# Patient Record
Sex: MALE | Race: WHITE | RURAL
[De-identification: names, ages, dates, MRNs, and addresses within clinical notes are randomized per-mention and may not be internally consistent; named-entity substitution may affect disease eponyms.]

---

## 2019-12-05 ENCOUNTER — OFFICE VISIT (OUTPATIENT)
Dept: FAMILY MEDICINE CLINIC | Age: 44
End: 2019-12-05

## 2019-12-05 VITALS
RESPIRATION RATE: 16 BRPM | WEIGHT: 210 LBS | SYSTOLIC BLOOD PRESSURE: 124 MMHG | DIASTOLIC BLOOD PRESSURE: 81 MMHG | OXYGEN SATURATION: 96 % | BODY MASS INDEX: 29.4 KG/M2 | TEMPERATURE: 98.7 F | HEIGHT: 71 IN | HEART RATE: 65 BPM

## 2019-12-05 DIAGNOSIS — Z23 ENCOUNTER FOR IMMUNIZATION: ICD-10-CM

## 2019-12-05 DIAGNOSIS — R30.0 DYSURIA: ICD-10-CM

## 2019-12-05 DIAGNOSIS — R39.15 URGENCY OF URINATION: ICD-10-CM

## 2019-12-05 DIAGNOSIS — N45.1 EPIDIDYMITIS: Primary | ICD-10-CM

## 2019-12-05 PROBLEM — G56.03 CARPAL TUNNEL SYNDROME ON BOTH SIDES: Status: ACTIVE | Noted: 2019-02-19

## 2019-12-05 PROBLEM — M54.2 CERVICALGIA: Status: ACTIVE | Noted: 2019-02-19

## 2019-12-05 PROBLEM — M47.812 SPONDYLOSIS OF CERVICAL REGION WITHOUT MYELOPATHY OR RADICULOPATHY: Status: ACTIVE | Noted: 2019-02-19

## 2019-12-05 LAB
BILIRUB UR QL STRIP: NEGATIVE
GLUCOSE UR-MCNC: NEGATIVE MG/DL
KETONES P FAST UR STRIP-MCNC: NORMAL MG/DL
PH UR STRIP: 7 [PH] (ref 4.6–8)
PROT UR QL STRIP: NEGATIVE
SP GR UR STRIP: 1.02 (ref 1–1.03)
UA UROBILINOGEN AMB POC: NORMAL (ref 0.2–1)
URINALYSIS CLARITY POC: CLEAR
URINALYSIS COLOR POC: YELLOW
URINE BLOOD POC: NEGATIVE
URINE LEUKOCYTES POC: NEGATIVE
URINE NITRITES POC: NEGATIVE

## 2019-12-05 RX ORDER — SILDENAFIL 100 MG/1
100 TABLET, FILM COATED ORAL
COMMUNITY
End: 2020-04-09 | Stop reason: SDUPTHER

## 2019-12-05 RX ORDER — CYCLOBENZAPRINE HCL 10 MG
TABLET ORAL
COMMUNITY
End: 2020-04-09

## 2019-12-05 RX ORDER — AMOXICILLIN AND CLAVULANATE POTASSIUM 875; 125 MG/1; MG/1
1 TABLET, FILM COATED ORAL EVERY 12 HOURS
Qty: 20 TAB | Refills: 0 | Status: SHIPPED | OUTPATIENT
Start: 2019-12-05 | End: 2019-12-15

## 2019-12-05 RX ORDER — NAPROXEN SODIUM 220 MG
TABLET ORAL
COMMUNITY

## 2019-12-05 NOTE — PROGRESS NOTES
I discussed the findings, assessment and plan in detail with the resident and agree with the resident's findings and plan as documented in the resident's note. Epididymitis is working dx in an area where we have levaquin resistance. Emilee Carranza M.D.

## 2019-12-05 NOTE — PROGRESS NOTES
1. Have you been to the ER, urgent care clinic since your last visit? Hospitalized since your last visit? Yes Urgent Care    2. Have you seen or consulted any other health care providers outside of the [address] [place] [address] since your last visit? Include any pap smears or colon screening. No  Reviewed record in preparation for visit and have necessary documentation  Pt did not bring medication to office visit for review    Goals that were addressed and/or need to be completed during or after this appointment include   Health Maintenance Due   Topic Date Due    DTaP/Tdap/Td series (1 - Tdap) 10/02/1987    Influenza Age 5 to Adult  08/01/2019     Braden Roldan is a 38 y.o. male who presents for routine immunizations. He denies any symptoms , reactions or allergies that would exclude them from being immunized today. Risks and adverse reactions were discussed and the VIS was given to them. All questions were addressed. . There were no reactions observed.     Zac Orourke LPN

## 2019-12-05 NOTE — PROGRESS NOTES
Progress Note    Patient: Nidhi Garcia MRN: [id number]  SSN: [id number]    YOB: 1975  Age: 38 y.o. Sex: male        Chief Complaint   Patient presents with   [facility] Establish Care    Immunization/Injection         Subjective:     Problems addressed:  Encounter Diagnoses     ICD-10-CM ICD-9-CM   1. Epididymitis N45.1 604.90   2. Dysuria R30.0 788.1   3. Urgency of urination R39.15 788.63   4. Encounter for immunization Z22 V03.89     37 y/o M presents to establish care and for dysuria. Reports PMH of carpal tunnel syndrome, DDD, and medically retired  in June 2019 for TBI, PTSD, hand numbness (radiculopathy). Will request prior medical records from [facility]. Pt reports dysuria and urgency for 3 weeks for which he went to urgent care over the weekend, says UA was normal and was told he needs and ultrasound but had to go to PCP first. Feels like \"kicked in my right nut for 3 weeks\". Fatigued. Denies any history of UTI or genital problems in the past.      Current and past medical information:    Current Medications after this visit[de-identified]     Current Outpatient Medications   Medication Sig    sildenafil citrate (VIAGRA) 100 mg tablet Take 100 mg by mouth.  naproxen sodium (NAPROSYN) 220 mg tablet Take  by mouth.  cyclobenzaprine (FLEXERIL) 10 mg tablet Take  by mouth.  amoxicillin-clavulanate (AUGMENTIN) 875-125 mg per tablet Take 1 Tab by mouth every twelve (12) hours for 10 days. No current facility-administered medications for this visit. Patient Active Problem List    Diagnosis Date Noted    Carpal tunnel syndrome on both sides 02/19/2019    Cervicalgia 02/19/2019    Spondylosis of cervical region without myelopathy or radiculopathy 02/19/2019       Past Medical History:   Diagnosis Date    DDD (degenerative disc disease), cervical        No Known Allergies    History reviewed. No pertinent surgical history.     Social History     Tobacco Use    Smoking status: Never Smoker    Smokeless tobacco: Never Used   Substance Use Topics    Alcohol use: Yes     Frequency: 4 or more times a week    Drug use: Never         Review of Systems   Constitutional: Negative for chills and fever. HENT: Negative for congestion. Eyes: Negative for blurred vision and double vision. Respiratory: Negative for cough and shortness of breath. Cardiovascular: Negative for chest pain and palpitations. Gastrointestinal: Negative for blood in stool, constipation, diarrhea, nausea and vomiting. Genitourinary: Positive for dysuria and urgency. Negative for hematuria. Musculoskeletal: Negative for myalgias. Skin: Negative for rash. Neurological: Negative for dizziness and headaches. Psychiatric/Behavioral: Negative for substance abuse. Objective:     Vitals:    12/05/19 0956   BP: 124/81   Pulse: 65   Resp: 16   Temp: 98.7 °F (37.1 °C)   TempSrc: Oral   SpO2: 96%   Weight: 210 lb (95.3 kg)   Height: 5' 11\" (1.803 m)      Body mass index is 29.29 kg/m². Physical Exam  Constitutional:       General: He is not in acute distress. Appearance: Normal appearance. He is not ill-appearing or diaphoretic. HENT:      Head: Normocephalic and atraumatic. Right Ear: External ear normal.      Left Ear: External ear normal.      Mouth/Throat:      Mouth: Mucous membranes are moist.      Pharynx: Oropharynx is clear. Eyes:      Conjunctiva/sclera: Conjunctivae normal.      Pupils: Pupils are equal, round, and reactive to light. Neck:      Musculoskeletal: Neck supple. Cardiovascular:      Rate and Rhythm: Normal rate and regular rhythm. Heart sounds: No murmur. Pulmonary:      Effort: Pulmonary effort is normal. No respiratory distress. Breath sounds: No wheezing or rhonchi. Abdominal:      General: Bowel sounds are normal. There is no distension. Palpations: Abdomen is soft. There is no mass. Tenderness: There is no tenderness.  There is no guarding or rebound. Hernia: No hernia is present. Genitourinary:     Penis: Normal.       Scrotum/Testes: Normal.      Prostate: Normal.      Rectum: Normal.      Comments: Tenderness to palpation above R testicle  Musculoskeletal:         General: No swelling, tenderness or deformity. Skin:     General: Skin is warm and dry. Neurological:      General: No focal deficit present. Mental Status: He is alert. Psychiatric:         Mood and Affect: Mood normal.         Behavior: Behavior normal.          There are no preventive care reminders to display for this patient. Assessment and orders:     Encounter Diagnoses     ICD-10-CM ICD-9-CM   1. Epididymitis N45.1 604.90   2. Dysuria R30.0 788.1   3. Urgency of urination R39.15 788.63   4. Encounter for immunization Z23 V03.89       37 y/o M with 3 weeks of dysuria and urgency likely 2/2 to epididymitis    1. Epididymitis - symptoms for 3 weeks, tenderness to palpation above R testicle on exam, 85% of prostatitis pathogens in the region resistant to levquin, POC UA unremarkable, no prostate tenderness  - REFERRAL TO UROLOGY for ultrasound  -START amoxicillin-clavulanate (AUGMENTIN) 875-125 mg per tablet; Take 1 Tab by mouth every twelve (12) hours for 10 days. Dispense: 20 Tab; Refill: 0  - CULTURE, URINE; Future    2. Dysuria - likely 2/2 to epididymitis   - Treatment as above    3. Urgency of urination - likely 2/2 to epididymitis   - Treatment as above      Plan of care:  Discussed diagnoses in detail with patient. Medication risks/benefits/side effects discussed with patient. All of the patient's questions were addressed. The patient understands and agrees with our plan of care. The patient knows to call back if they are unsure of or forget any changes we discussed today or if the symptoms change.      The patient received an After-Visit Summary which contains VS, orders, medication list and allergy list. This can be used as a \"mini-medical record\" should they have to seek medical care while out of town. Follow-up and Dispositions    · Return in about 2 weeks (around 12/19/2019), or if symptoms worsen or fail to improve, for Infection, Pain, ultrasound results.          Future Appointments   Date Time Provider Angélica Rehman   12/24/2019  8:00 AM Rolando Mike MD [facility] SCHED       Signed By: Yolanda Marte MD     December 5, 2019

## 2019-12-05 NOTE — PATIENT INSTRUCTIONS
December 5, 2019  [address]      Dear Anoop Alberts: Thank you for requesting access to [employer]. Please follow the instructions below to view your test results, access and download parts of your medical record, view details of your past and upcoming appointments, and view your medications online. How Do I Sign Up? 1. In your internet browser, go to [email or web address]  2. Click on the First Time User? Click Here link in the Sign In box. You will see the New Member Sign Up page. 3. Enter your [employer] Access Code exactly as it appears below. You will not need to use this code after youve completed the sign-up process. If you do not sign up before the expiration date, you must request a new code. · [facility] Access Code: [email or web address]  · Expires: 1/19/2020  6:58 AM    4. Enter the last four digits of your Social Security Number (xxxx) and Date of Birth (mm/dd/yyyy) as indicated and click Submit. You will be taken to the next sign-up page. 5. Create a [facility]taic ID. This will be your [employer] login ID and cannot be changed, so think of one that is secure and easy to remember. 6. Create a [facility] password. You can change your password at any time. 7. Enter your Password Reset Question and Answer. This can be used at a later time if you forget your password. 8. Enter your e-mail address. You will receive e-mail notification when new information is available in [address]. 9. Click Sign Up. You can now view and download portions of your medical record. 10. Click the Download Summary menu link to download a portable copy of your medical information. Additional Information:              If you require any assistance or have any questions, please contact our [address] at [phone], email at [email or web address] or check online in our Frequently Asked Questions.     Remember, [employer] is NOT to be used for urgent needs. For medical emergencies, dial 911. Now available from your iPhone and Android! Sincerely,   Lacey Leon       new to my practice         Epididymitis and Orchitis: Care Instructions  Your Care Instructions    Epididymitis is pain and swelling of the tube that is attached to each testicle. This tube is called the epididymis. Orchitis is pain and swelling of the testicle. Infection with bacteria often causes these conditions. Sexually transmitted infections (STIs) also can cause both conditions. This is often the case in men younger than 28. Other causes in boys and older men are infections from surgery or having a catheter that drains urine. The mumps virus also can cause orchitis. Anti-inflammatory or pain medicines can help with the pain. Antibiotics are used if the problem is caused by bacteria. They are not used if a virus is the cause. Your testicle may stay swollen for many days or even a few weeks. The doctor has checked you carefully, but problems can develop later. If you notice any problems or new symptoms, get medical treatment right away. Follow-up care is a key part of your treatment and safety. Be sure to make and go to all appointments, and call your doctor if you are having problems. It's also a good idea to know your test results and keep a list of the medicines you take. How can you care for yourself at home? · If your doctor prescribed antibiotics, take them as directed. Do not stop taking them just because you feel better. You need to take the full course of antibiotics. · Ask your doctor if you can take an over-the-counter pain medicine, such as acetaminophen (Tylenol), ibuprofen (Advil, Motrin), or naproxen (Aleve). Be safe with medicines. Read and follow all instructions on the label. · Limit your activity to what is comfortable. · Wear snug underwear or an athletic supporter. This can help reduce pain. · Apply either cold or heat to the swollen area.  Use the one that works best for your pain. Sitting in a warm bath for 15 minutes twice a day will help reduce the swelling more quickly. · If you have been told that an STI may have caused your condition, do not have sex until your doctor says it is safe. This will prevent spreading the infection. Tell your sex partner or partners that they need to be checked. They may need treatment. When should you call for help? Call your doctor now or seek immediate medical care if:    · Your pain gets worse.     · You have a new or higher fever.     · You have new or more swelling of your testicle.     · You have new belly pain, or your pain gets worse.    Watch closely for changes in your health, and be sure to contact your doctor if:    · You do not get better as expected. Where can you learn more? Go to http://maria elena-dre.info/. Enter S360 in the search box to learn more about \"Epididymitis and Orchitis: Care Instructions. \"  Current as of: December 19, 2018  Content Version: 12.2  © 7273-5553 [employer]. Care instructions adapted under license by [employer] (which disclaims liability or warranty for this information). If you have questions about a medical condition or this instruction, always ask your healthcare professional. [address] any warranty or liability for your use of this information.

## 2019-12-06 ENCOUNTER — HOSPITAL ENCOUNTER (OUTPATIENT)
Dept: LAB | Age: 44
Discharge: HOME | End: 2019-12-06

## 2019-12-06 DIAGNOSIS — R30.0 DYSURIA: ICD-10-CM

## 2019-12-06 DIAGNOSIS — N45.1 EPIDIDYMITIS: ICD-10-CM

## 2019-12-06 LAB
COMMENT, HOLDF: NORMAL
SAMPLES BEING HELD,HOLD: NORMAL

## 2019-12-08 LAB
BACTERIA SPEC CULT: NORMAL
CC UR VC: NORMAL
SERVICE CMNT-IMP: NORMAL

## 2019-12-09 NOTE — PROGRESS NOTES
Results noted. No further actions required.      Estefany Rodriguez MD  PGY2 Family Medicine Resident

## 2019-12-13 PROBLEM — F43.10 PTSD (POST-TRAUMATIC STRESS DISORDER): Status: ACTIVE | Noted: 2019-12-13

## 2019-12-24 ENCOUNTER — OFFICE VISIT (OUTPATIENT)
Dept: FAMILY MEDICINE CLINIC | Age: 44
End: 2019-12-24

## 2019-12-24 VITALS
HEART RATE: 61 BPM | SYSTOLIC BLOOD PRESSURE: 120 MMHG | TEMPERATURE: 98.1 F | HEIGHT: 71 IN | BODY MASS INDEX: 30.52 KG/M2 | DIASTOLIC BLOOD PRESSURE: 81 MMHG | OXYGEN SATURATION: 98 % | WEIGHT: 218 LBS | RESPIRATION RATE: 18 BRPM

## 2019-12-24 DIAGNOSIS — G89.29 CHRONIC PAIN OF LEFT ANKLE: ICD-10-CM

## 2019-12-24 DIAGNOSIS — N45.1 EPIDIDYMITIS: Primary | ICD-10-CM

## 2019-12-24 DIAGNOSIS — M25.572 CHRONIC PAIN OF LEFT ANKLE: ICD-10-CM

## 2019-12-24 RX ORDER — LEVOFLOXACIN 750 MG/1
750 TABLET ORAL DAILY
Qty: 10 TAB | Refills: 0 | Status: SHIPPED | OUTPATIENT
Start: 2019-12-24 | End: 2020-01-03

## 2019-12-24 NOTE — PATIENT INSTRUCTIONS
Epididymitis and Orchitis: Care Instructions  Your Care Instructions    Epididymitis is pain and swelling of the tube that is attached to each testicle. This tube is called the epididymis. Orchitis is pain and swelling of the testicle. Infection with bacteria often causes these conditions. Sexually transmitted infections (STIs) also can cause both conditions. This is often the case in men younger than 28. Other causes in boys and older men are infections from surgery or having a catheter that drains urine. The mumps virus also can cause orchitis. Anti-inflammatory or pain medicines can help with the pain. Antibiotics are used if the problem is caused by bacteria. They are not used if a virus is the cause. Your testicle may stay swollen for many days or even a few weeks. The doctor has checked you carefully, but problems can develop later. If you notice any problems or new symptoms, get medical treatment right away. Follow-up care is a key part of your treatment and safety. Be sure to make and go to all appointments, and call your doctor if you are having problems. It's also a good idea to know your test results and keep a list of the medicines you take. How can you care for yourself at home? · If your doctor prescribed antibiotics, take them as directed. Do not stop taking them just because you feel better. You need to take the full course of antibiotics. · Ask your doctor if you can take an over-the-counter pain medicine, such as acetaminophen (Tylenol), ibuprofen (Advil, Motrin), or naproxen (Aleve). Be safe with medicines. Read and follow all instructions on the label. · Limit your activity to what is comfortable. · Wear snug underwear or an athletic supporter. This can help reduce pain. · Apply either cold or heat to the swollen area. Use the one that works best for your pain. Sitting in a warm bath for 15 minutes twice a day will help reduce the swelling more quickly.   · If you have been told that an STI may have caused your condition, do not have sex until your doctor says it is safe. This will prevent spreading the infection. Tell your sex partner or partners that they need to be checked. They may need treatment. When should you call for help? Call your doctor now or seek immediate medical care if:    · Your pain gets worse.     · You have a new or higher fever.     · You have new or more swelling of your testicle.     · You have new belly pain, or your pain gets worse.    Watch closely for changes in your health, and be sure to contact your doctor if:    · You do not get better as expected. Where can you learn more? Go to http://maria elena-dre.info/. Enter S360 in the search box to learn more about \"Epididymitis and Orchitis: Care Instructions. \"  Current as of: December 19, 2018  Content Version: 12.2  © 3261-9147 [employer]. Care instructions adapted under license by [employer] (which disclaims liability or warranty for this information). If you have questions about a medical condition or this instruction, always ask your healthcare professional. [address] any warranty or liability for your use of this information.

## 2019-12-24 NOTE — PROGRESS NOTES
1. Have you been to the ER, urgent care clinic since your last visit? Hospitalized since your last visit? No    2. Have you seen or consulted any other health care providers outside of the [address] [place] [address] since your last visit? Include any pap smears or colon screening. Urologist     Reviewed record in preparation for visit and have necessary documentation  Goals that were addressed and/or need to be completed during or after this appointment include     There are no preventive care reminders to display for this patient. Patient is accompanied by self I have received verbal consent from Shaan Leon to discuss any/all medical information while they are present in the room.

## 2019-12-24 NOTE — PROGRESS NOTES
Progress Note    Patient: Baldemar Cash MRN: [id number]  SSN: [id number]    YOB: 1975  Age: 38 y.o. Sex: male        Chief Complaint   Patient presents with    Follow-up         Subjective:     Problems addressed:  Encounter Diagnoses     ICD-10-CM ICD-9-CM   1. Epididymitis N45.1 604.90   2. Chronic pain of left ankle M25.572 719.47    G89.29 338.29       HPI: 39 y/o M presents for f/u for Epididymitis. Pt took Augmentin as prescribed for 10 days and symptoms got \"about 85% better\". Pain about 1-2 inches above R testicle. After completing antibiotic, pain has now started to worsen and become more frequent and is \"just as bad as it was before\". Pt denies any risk for STIs, \"I've been with the same woman for 16 years\". Pt drinking a lot of water. Denies any hematuria, abdominal pain, swelling, N/V. Chronic L ankle pain: Pt requests referral for chronic L ankle pain. Hx of \"many\" sprained ankles, saw podiatries who told him he would need surgery soon for \"ligaments\". Pain is now bothering pt more and has apt with prior podiatrist in February, but would like referral to see someone else if he can get in sooner. Pt denies any hx of surgery to L ankle or other injuries other than \"sprained ankles\". Current and past medical information:    Current Medications after this visit[de-identified]     Current Outpatient Medications   Medication Sig    levoFLOXacin (LEVAQUIN) 750 mg tablet Take 1 Tab by mouth daily for 10 days.  sildenafil citrate (VIAGRA) 100 mg tablet Take 100 mg by mouth.  naproxen sodium (NAPROSYN) 220 mg tablet Take  by mouth.  cyclobenzaprine (FLEXERIL) 10 mg tablet Take  by mouth. No current facility-administered medications for this visit.         Patient Active Problem List    Diagnosis Date Noted    PTSD (post-traumatic stress disorder) 12/13/2019    Carpal tunnel syndrome on both sides 02/19/2019    Cervicalgia 02/19/2019    Spondylosis of cervical region without myelopathy or radiculopathy 02/19/2019       Past Medical History:   Diagnosis Date    DDD (degenerative disc disease), cervical        No Known Allergies    History reviewed. No pertinent surgical history. Social History     Tobacco Use    Smoking status: Never Smoker    Smokeless tobacco: Never Used   Substance Use Topics    Alcohol use: Yes     Frequency: 4 or more times a week    Drug use: Never         Review of Systems   Constitutional: Negative for chills and fever. HENT: Negative for congestion and sore throat. Respiratory: Negative for cough and shortness of breath. Gastrointestinal: Negative for abdominal pain, nausea and vomiting. Genitourinary:        Pain above R testicle         Objective:     Vitals:    12/24/19 0806   BP: 120/81   Pulse: 61   Resp: 18   Temp: 98.1 °F (36.7 °C)   TempSrc: Oral   SpO2: 98%   Weight: 218 lb (98.9 kg)   Height: 5' 11\" (1.803 m)      Body mass index is 30.4 kg/m². Physical Exam  Constitutional:       General: He is not in acute distress. Appearance: Normal appearance. He is not diaphoretic. HENT:      Head: Normocephalic and atraumatic. Right Ear: External ear normal.      Left Ear: External ear normal.      Mouth/Throat:      Mouth: Mucous membranes are moist.      Pharynx: Oropharynx is clear. Eyes:      Conjunctiva/sclera: Conjunctivae normal.   Neck:      Musculoskeletal: Neck supple. Cardiovascular:      Rate and Rhythm: Normal rate and regular rhythm. Heart sounds: No murmur. Pulmonary:      Effort: Pulmonary effort is normal. No respiratory distress. Breath sounds: No wheezing or rhonchi. Abdominal:      General: Bowel sounds are normal. There is no distension. Palpations: Abdomen is soft. Tenderness: There is no tenderness.    Genitourinary:     Penis: Normal.       Scrotum/Testes: Normal.      Comments: Tenderness to palpation above R testicle, no erythema or swelling  Musculoskeletal:      Left ankle: He exhibits normal range of motion and no swelling. No tenderness. Skin:     General: Skin is warm and dry. Neurological:      General: No focal deficit present. Mental Status: He is alert. Psychiatric:         Mood and Affect: Mood normal.         Behavior: Behavior normal.          There are no preventive care reminders to display for this patient. Assessment and orders:     Encounter Diagnoses     ICD-10-CM ICD-9-CM   1. Epididymitis N45.1 604.90   2. Chronic pain of left ankle M25.572 719.47    G89.29 338.29       37 y/o M with worsening symptoms of epididymitis after initial improvement with antibiotics     1. Epididymitis - was confirmed by ultrasound with Urology, negative urine culture, symptoms improved with Augmentin \"about 85%\", then symptoms worsened over the past 10 days after completing Augmentin course  -START levoFLOXacin (LEVAQUIN) 750 mg tablet; Take 1 Tab by mouth daily for 10 days. Dispense: 10 Tab; Refill: 0  -Continue Naprosyn PRN for pain  -Will see pt back in 10 days, if without complete resolution of symptoms, will extend antibiotic duration to a total of 14 days    2. Chronic pain of left ankle - hx of many ankle sprains, pt reports prior Podiatrist recommended surgery, can't get back to see them until February, requests Podiatry referral to see someone sooner  - [facility]:  Discussed diagnoses in detail with patient. Medication risks/benefits/side effects discussed with patient. All of the patient's questions were addressed. The patient understands and agrees with our plan of care. The patient knows to call back if they are unsure of or forget any changes we discussed today or if the symptoms change. The patient received an After-Visit Summary which contains VS, orders, medication list and allergy list. This can be used as a \"mini-medical record\" should they have to seek medical care while out of town.     Follow-up and Dispositions    · Return in about 10 days (around 1/3/2020), or if symptoms worsen or fail to improve, for Epididymitis .          Future Appointments   Date Time Provider Angélica Rehman   1/3/2020  8:00 AM Bobby Pete MD [facility] ROS[facility] SCHED       Signed By: Suzette Quan MD     December 24, 2019

## 2020-01-03 ENCOUNTER — OFFICE VISIT (OUTPATIENT)
Dept: FAMILY MEDICINE CLINIC | Age: 45
End: 2020-01-03

## 2020-01-03 VITALS
OXYGEN SATURATION: 95 % | TEMPERATURE: 98.2 F | HEIGHT: 71 IN | BODY MASS INDEX: 30.52 KG/M2 | WEIGHT: 218 LBS | SYSTOLIC BLOOD PRESSURE: 110 MMHG | DIASTOLIC BLOOD PRESSURE: 69 MMHG | HEART RATE: 67 BPM | RESPIRATION RATE: 16 BRPM

## 2020-01-03 DIAGNOSIS — G89.29 CHRONIC PAIN OF LEFT ANKLE: ICD-10-CM

## 2020-01-03 DIAGNOSIS — M25.572 CHRONIC PAIN OF LEFT ANKLE: ICD-10-CM

## 2020-01-03 DIAGNOSIS — N45.1 EPIDIDYMITIS: Primary | ICD-10-CM

## 2020-01-03 DIAGNOSIS — M47.812 SPONDYLOSIS OF CERVICAL REGION WITHOUT MYELOPATHY OR RADICULOPATHY: ICD-10-CM

## 2020-01-03 NOTE — PROGRESS NOTES
Progress Note    Patient: Camille Chopra MRN: [id number]  SSN: [id number]    YOB: 1975  Age: 38 y.o. Sex: male        No chief complaint on file. Subjective:     Problems addressed:  Encounter Diagnoses     ICD-10-CM ICD-9-CM   1. Epididymitis N45.1 604.90   2. Chronic pain of left ankle M25.572 719.47    G89.29 338.29   3. Spondylosis of cervical region without myelopathy or radiculopathy M47.812 721.0       HPI: 37 y/o M presents for f/u for Epididymitis. Pt completed Levaquin 2 days ago as prescribed, reports symptoms improved 2-3 days after starting Levaquin. No pt reports just an \"occasional twinge\" every now and then, but otherwise symptoms completely resolved. Denies any fever, chills, N/V, diarrhea, constipation, abdominal pain, hematuria, dysuria, urinary frequency, or blood in stool. Reports he is continued to get treatment for TBI, PTSD, and back pain \"up there\". Pt does request information on Podiatry referral for chronic L ankle pain. Prior podiatrist about 1 year ago said pt would need surgery at some point and pt would like to look into surgery now. Does not remember what injury was called, but says was from rolling ankle many times and tightening boots while running in the [facility]. Current and past medical information:    Current Medications after this visit[de-identified]     Current Outpatient Medications   Medication Sig    sildenafil citrate (VIAGRA) 100 mg tablet Take 100 mg by mouth.  naproxen sodium (NAPROSYN) 220 mg tablet Take  by mouth.  cyclobenzaprine (FLEXERIL) 10 mg tablet Take  by mouth. No current facility-administered medications for this visit.         Patient Active Problem List    Diagnosis Date Noted    PTSD (post-traumatic stress disorder) 12/13/2019    Carpal tunnel syndrome on both sides 02/19/2019    Cervicalgia 02/19/2019    Spondylosis of cervical region without myelopathy or radiculopathy 02/19/2019       Past Medical History:   Diagnosis Date  DDD (degenerative disc disease), cervical        No Known Allergies    History reviewed. No pertinent surgical history. Social History     Tobacco Use    Smoking status: Never Smoker    Smokeless tobacco: Never Used   Substance Use Topics    Alcohol use: Yes     Frequency: 4 or more times a week    Drug use: Never         Review of Systems   Constitutional: Negative for chills and fever. Respiratory: Negative for cough and shortness of breath. Gastrointestinal: Negative for abdominal pain, nausea and vomiting. Genitourinary: Negative for dysuria, frequency, hematuria and urgency. Objective:     Vitals:    01/03/20 0804   BP: 110/69   Pulse: 67   Resp: 16   Temp: 98.2 °F (36.8 °C)   TempSrc: Oral   SpO2: 95%   Weight: 218 lb (98.9 kg)   Height: 5' 11\" (1.803 m)      Body mass index is 30.4 kg/m². Physical Exam  Vitals signs reviewed. Constitutional:       General: He is not in acute distress. Appearance: Normal appearance. He is not diaphoretic. HENT:      Head: Normocephalic and atraumatic. Right Ear: External ear normal.      Left Ear: External ear normal.      Mouth/Throat:      Mouth: Mucous membranes are moist.      Pharynx: Oropharynx is clear. Eyes:      Conjunctiva/sclera: Conjunctivae normal.      Pupils: Pupils are equal, round, and reactive to light. Cardiovascular:      Rate and Rhythm: Normal rate. Pulmonary:      Effort: Pulmonary effort is normal. No respiratory distress. Breath sounds: No wheezing. Abdominal:      General: Bowel sounds are normal. There is no distension. Palpations: Abdomen is soft. Tenderness: There is no tenderness. There is no guarding. Genitourinary:     Comments: Genital exam declined today  Musculoskeletal:         General: No swelling or deformity. Left ankle: He exhibits no swelling, no ecchymosis and no deformity. No tenderness. Skin:     General: Skin is warm and dry.    Neurological:      General: No focal deficit present. Mental Status: He is alert. Psychiatric:         Mood and Affect: Mood normal.         Behavior: Behavior normal.          There are no preventive care reminders to display for this patient. Assessment and orders:     Encounter Diagnoses     ICD-10-CM ICD-9-CM   1. Epididymitis N45.1 604.90   2. Chronic pain of left ankle M25.572 719.47    G89.29 338.29   3. Spondylosis of cervical region without myelopathy or radiculopathy M47.812 721.0       37 y/o M with resolved epididymitis and chronic L ankle pain    1. Epididymitis - resolved, initial abx helped, confirmed by ultrasound with Urology, return of symptoms prompted second round of abx, and now symptoms resolved   -Pt to monitor for recurring symptoms    2. Chronic pain of left ankle - pt reports due to many sprained ankles in the [employer], prior podiatrist recommended surgery, pt wants to see a new podiatrist for surgery evaluation   -Referral to Podiatry  -Continue current pain medications as previously prescribed     3. Spondylosis of cervical region without myelopathy or radiculopathy   -Continue current pain medications as previously prescribed   -Continue to f/u with Tonia Cruz as indicated          Plan of care:  Discussed diagnoses in detail with patient. Medication risks/benefits/side effects discussed with patient. All of the patient's questions were addressed. The patient understands and agrees with our plan of care. The patient knows to call back if they are unsure of or forget any changes we discussed today or if the symptoms change. The patient received an After-Visit Summary which contains VS, orders, medication list and allergy list. This can be used as a \"mini-medical record\" should they have to seek medical care while out of town. Follow-up and Dispositions    · Return if symptoms worsen or fail to improve. No future appointments.     Signed By: Chanel Owen MD     January 3, 2020

## 2020-01-03 NOTE — PROGRESS NOTES
1. Have you been to the ER, urgent care clinic since your last visit? Hospitalized since your last visit? No    2. Have you seen or consulted any other health care providers outside of the [address] [place] [address] since your last visit? Include any pap smears or colon screening. No  Reviewed record in preparation for visit and have necessary documentation  Pt did not bring medication to office visit for review    Goals that were addressed and/or need to be completed during or after this appointment include   There are no preventive care reminders to display for this patient.

## 2020-01-21 PROBLEM — M76.822 POSTERIOR TIBIAL TENDINITIS OF LEFT LOWER EXTREMITY: Status: ACTIVE | Noted: 2020-01-21

## 2020-04-07 RX ORDER — SILDENAFIL 100 MG/1
100 TABLET, FILM COATED ORAL
OUTPATIENT
Start: 2020-04-07

## 2020-04-07 NOTE — TELEPHONE ENCOUNTER
Viagra never filled before for this patient, but he is new to our practice and prior medical records do show he was taking this medication. Will attempt to set up a phone or virtual visit to specifically discuss this medication prior to prescribing. Will hopefully be able to do a phone visit tomorrow morning.

## 2020-04-09 ENCOUNTER — VIRTUAL VISIT (OUTPATIENT)
Dept: FAMILY MEDICINE CLINIC | Age: 45
End: 2020-04-09

## 2020-04-09 DIAGNOSIS — M47.812 SPONDYLOSIS OF CERVICAL REGION WITHOUT MYELOPATHY OR RADICULOPATHY: ICD-10-CM

## 2020-04-09 DIAGNOSIS — N52.9 ERECTILE DYSFUNCTION, UNSPECIFIED ERECTILE DYSFUNCTION TYPE: Primary | ICD-10-CM

## 2020-04-09 RX ORDER — TIZANIDINE 2 MG/1
TABLET ORAL
COMMUNITY

## 2020-04-09 RX ORDER — CELECOXIB 200 MG/1
CAPSULE ORAL
COMMUNITY

## 2020-04-09 RX ORDER — SILDENAFIL 100 MG/1
50 TABLET, FILM COATED ORAL
Qty: 15 TAB | Refills: 1 | Status: SHIPPED | OUTPATIENT
Start: 2020-04-09 | End: 2021-08-04 | Stop reason: SDUPTHER

## 2020-04-09 NOTE — PROGRESS NOTES
I discussed the findings, assessment and plan in detail with the resident and agree with the resident's findings and plan as documented in the resident's note. Lei Brunner M.D.

## 2020-04-09 NOTE — PROGRESS NOTES
Amrita Ortiz  38 y.o. male  1975  [address] [address] [address]  [id number]    [phone] (home)      [facility]:    Telephone Encounter  Monster Salazar MD       Encounter Date: 4/9/2020 at 8:12 AM    Consent:   He and/or the health care decision maker is aware that that he may receive a bill for this telephone service, depending on his insurance coverage, and has provided verbal consent to proceed: Yes    Chief Complaint   Patient presents with    Medication Refill       History of Present Illness   Amrita Ortiz is a 38 y.o. male was evaluated by telephone. I communicated with the patient and/or health care decision maker about medication refill for viagra. Viagra not previously prescribed at this practice, but patient is a new patient here and prior medical records received with Viagra on prior medication list. Patient reports occasional Viagra use, would get 6 tabs at a time from prior PCP and takes 1/2 tab as needed, usually a couple of tabs a month. Patient report occasional difficulty getting errections and Viagra works well for him. Pt denies any side effects other than mild headache sometimes after taking medication which is relieved with tylenol. Patient denies any hx of painful erections, erection lasting more than 4 hours, dizziness, CP, SOB, or other complaints. Pt reports he occasionally takes celebrex and tizanidine for when \"I over-do it and hurt my back\", about once a month, counseled to avoid taking Viagra and Tizanidine at the same time. Review of Systems   Review of Systems   Constitutional: Negative for chills and fever. Respiratory: Negative for cough and shortness of breath. Cardiovascular: Negative for chest pain. Gastrointestinal: Negative for abdominal pain. Genitourinary: Negative for dysuria and hematuria. Trouble getting errections       Vitals/Objective:   General: Patient speaking in complete sentences without effort. Normal speech and cooperative. Due to this being a Virtual Check-in/Telephone evaluation, many elements of the physical examination are unable to be assessed. Assessment and Plan:   Time-based coding, delete if not needed: I spent at least 15 minutes with this established patient, and >50% of the time was spent counseling and/or coordinating care regarding erectie dysfunction  Total Time: minutes: 11-20 minutes    37 y/o M with stable chronic conditions and requiring medication refills    1. Erectile dysfunction, unspecified erectile dysfunction type - stable  -REFILLED sildenafil citrate (VIAGRA) 100 mg tablet; Take 0.5 Tabs by mouth daily as needed for Erectile Dysfunction. Dispense: 15 Tab; Refill: 1    2. Spondylosis of cervical region without myelopathy or radiculopathy - stable  -Continue current medications        We discussed the expected course, resolution and complications of the diagnosis(es) in detail. Medication risks, benefits, costs, interactions, and alternatives were discussed as indicated. I advised him to contact the office if his condition worsens, changes or fails to improve as anticipated. He expressed understanding with the diagnosis(es) and plan. Patient understands that this encounter was a temporary measure, and the importance of further follow up and examination was emphasized. Patient verbalized understanding. Patient informed to follow up: for check up in a couple of months    I affirm this is a Patient Initiated Episode with an Established Patient who has not had a related appointment within my department in the past 7 days or scheduled within the next 24 hours. Note: not billable if this call serves to triage the patient into an appointment for the relevant concern      Electronically Signed: López Munoz MD  Providers location when delivering service: Home        ICD-10-CM ICD-9-CM    1.  Erectile dysfunction, unspecified erectile dysfunction type N52.9 607.84 sildenafil citrate (VIAGRA) 100 mg tablet   2. Spondylosis of cervical region without myelopathy or radiculopathy M47.812 721.0        Pursuant to the emergency declaration under the [address] [place] [address] waiver authority and the [employer] Act, this Virtual  Visit was conducted, with patient's consent, to reduce the patient's risk of exposure to COVID-19 and provide continuity of care for an established patient. History   Patients past medical, surgical and family histories were personally reviewed. Past Medical History:   Diagnosis Date    DDD (degenerative disc disease), cervical      No past surgical history on file.   Family History   Problem Relation Age of Onset    Diabetes Maternal Grandfather     Heart Failure Maternal Grandfather      Social History     Socioeconomic History    Marital status:      Spouse name: Not on file    Number of children: Not on file    Years of education: Not on file    Highest education level: Not on file   Occupational History    Not on file   Social Needs    Financial resource strain: Not on file    Food insecurity     Worry: Not on file     Inability: Not on file    Transportation needs     Medical: Not on file     Non-medical: Not on file   Tobacco Use    Smoking status: Never Smoker    Smokeless tobacco: Never Used   Substance and Sexual Activity    Alcohol use: Yes     Frequency: 4 or more times a week    Drug use: Never    Sexual activity: Not on file   Lifestyle    Physical activity     Days per week: Not on file     Minutes per session: Not on file    Stress: Not on file   Relationships    Social connections     Talks on phone: Not on file     Gets together: Not on file     Attends Confucianist service: Not on file     Active member of club or organization: Not on file     Attends meetings of clubs or organizations: Not on file     Relationship status: Not on file    Intimate partner violence     Fear of current or ex partner: Not on file     Emotionally abused: Not on file     Physically abused: Not on file     Forced sexual activity: Not on file   Other Topics Concern    Not on file   Social History Narrative    Not on file            Current Medications/Allergies   Medications and Allergies reviewed:    Current Outpatient Medications   Medication Sig Dispense Refill    celecoxib (CELEBREX) 200 mg capsule Take  by mouth.  tiZANidine (ZANAFLEX) 2 mg tablet Take  by mouth.  sildenafil citrate (VIAGRA) 100 mg tablet Take 0.5 Tabs by mouth daily as needed for Erectile Dysfunction. 15 Tab 1    naproxen sodium (NAPROSYN) 220 mg tablet Take  by mouth.        No Known Allergies

## 2021-08-04 DIAGNOSIS — N52.9 ERECTILE DYSFUNCTION, UNSPECIFIED ERECTILE DYSFUNCTION TYPE: ICD-10-CM

## 2021-08-05 RX ORDER — SILDENAFIL 100 MG/1
50 TABLET, FILM COATED ORAL
Qty: 15 TABLET | Refills: 0 | Status: SHIPPED | OUTPATIENT
Start: 2021-08-05 | End: 2021-08-13 | Stop reason: SDUPTHER

## 2021-08-13 ENCOUNTER — OFFICE VISIT (OUTPATIENT)
Dept: FAMILY MEDICINE CLINIC | Age: 46
End: 2021-08-13
Payer: OTHER GOVERNMENT

## 2021-08-13 VITALS
HEART RATE: 76 BPM | RESPIRATION RATE: 15 BRPM | HEIGHT: 71 IN | BODY MASS INDEX: 30.21 KG/M2 | DIASTOLIC BLOOD PRESSURE: 78 MMHG | OXYGEN SATURATION: 95 % | SYSTOLIC BLOOD PRESSURE: 120 MMHG | TEMPERATURE: 98 F | WEIGHT: 215.8 LBS

## 2021-08-13 DIAGNOSIS — E66.9 OBESITY (BMI 30-39.9): ICD-10-CM

## 2021-08-13 DIAGNOSIS — Z13.9 ENCOUNTER FOR SCREENING: ICD-10-CM

## 2021-08-13 DIAGNOSIS — N52.9 ERECTILE DYSFUNCTION, UNSPECIFIED ERECTILE DYSFUNCTION TYPE: Primary | ICD-10-CM

## 2021-08-13 PROCEDURE — 99214 OFFICE O/P EST MOD 30 MIN: CPT | Performed by: STUDENT IN AN ORGANIZED HEALTH CARE EDUCATION/TRAINING PROGRAM

## 2021-08-13 RX ORDER — SILDENAFIL 100 MG/1
50 TABLET, FILM COATED ORAL
Qty: 10 TABLET | Refills: 0 | Status: SHIPPED | OUTPATIENT
Start: 2021-08-13

## 2021-08-13 RX ORDER — SILDENAFIL 100 MG/1
50 TABLET, FILM COATED ORAL
Qty: 30 TABLET | Refills: 2 | Status: SHIPPED | OUTPATIENT
Start: 2021-08-13 | End: 2021-08-13

## 2021-08-13 NOTE — PROGRESS NOTES
Subjective  Jaimee Hardwick is an 41 y.o. male who presents for follow upon ED. Improved with viagra. Has been on it for a while. Patient feels gassy after taking it. Able to get erections PRN for up to 6 hours after taking dose. Not a smoker. Has had surgery on his ankle and has had some unwanted weight gain since. Gained 10 pounds in 1 year. Patient also suffers for PTSD and believes this is the most likely cause of his ED. No CP, SOB. Allergies - reviewed:   No Known Allergies      Medications - reviewed:   Current Outpatient Medications   Medication Sig    sildenafil citrate (VIAGRA) 100 mg tablet Take 0.5 Tablets by mouth daily as needed for Erectile Dysfunction.  celecoxib (CELEBREX) 200 mg capsule Take  by mouth.  tiZANidine (ZANAFLEX) 2 mg tablet Take  by mouth.  naproxen sodium (NAPROSYN) 220 mg tablet Take  by mouth. No current facility-administered medications for this visit. Past Medical History - reviewed:  Past Medical History:   Diagnosis Date    DDD (degenerative disc disease), cervical          Past Surgical History - reviewed:   Past Surgical History:   Procedure Laterality Date    HX ORTHOPAEDIC Left 07/2020    athroscopy, ankle         Social History - reviewed:  Social History     Socioeconomic History    Marital status:      Spouse name: Not on file    Number of children: Not on file    Years of education: Not on file    Highest education level: Not on file   Occupational History    Not on file   Tobacco Use    Smoking status: Never Smoker    Smokeless tobacco: Never Used   Substance and Sexual Activity    Alcohol use:  Yes    Drug use: Never    Sexual activity: Not on file   Other Topics Concern    Not on file   Social History Narrative    Not on file     Social Determinants of Health     Financial Resource Strain:     Difficulty of Paying Living Expenses:    Food Insecurity:     Worried About Running Out of Food in the Last Year:     Ricki corea Food in the Last Year:    Transportation Needs:     Lack of Transportation (Medical):  Lack of Transportation (Non-Medical):    Physical Activity:     Days of Exercise per Week:     Minutes of Exercise per Session:    Stress:     Feeling of Stress :    Social Connections:     Frequency of Communication with Friends and Family:     Frequency of Social Gatherings with Friends and Family:     Attends Religion Services:     Active Member of Clubs or Organizations:     Attends Club or Organization Meetings:     Marital Status:    Intimate Partner Violence:     Fear of Current or Ex-Partner:     Emotionally Abused:     Physically Abused:     Sexually Abused:          Family History - reviewed:  Family History   Problem Relation Age of Onset    Diabetes Maternal Grandfather     Heart Failure Maternal Grandfather          Immunizations - reviewed:   Immunization History   Administered Date(s) Administered    Covid-19, MODERNA, Mrna, Lnp-s, Pf, 100mcg/0.5mL 03/31/2021, 04/28/2021    Influenza Vaccine (Quad) PF (>6 Mo Flulaval, Fluarix, and >3 Yrs Afluria, Fluzone 95140) 12/05/2019    Tdap 08/03/2019         ROS  In HPI      Physical Exam  Visit Vitals  /78   Pulse 76   Temp 98 °F (36.7 °C)   Resp 15   Ht 5' 11\" (1.803 m)   Wt 215 lb 12.8 oz (97.9 kg)   SpO2 95%   BMI 30.10 kg/m²       General appearance - alert, well appearing, and in no distress  Eyes - pupils equal, extraocular eye movements intact  Mouth - mucous membranes moist, pharynx normal without lesions  Neck - supple, no significant adenopathy  Chest - clear to auscultation, no wheezes, rales or rhonchi, symmetric air entry  Heart - normal rate, regular rhythm, normal S1, S2, no murmurs, rubs, clicks or gallops  Abdomen - soft, nontender, nondistended, no masses or organomegaly  Neurological - alert, oriented, normal speech, no focal findings or movement disorder noted      Assessment/Plan    ICD-10-CM ICD-9-CM    1.  Erectile dysfunction, unspecified erectile dysfunction type  N52.9 607.84 sildenafil citrate (VIAGRA) 100 mg tablet      DISCONTINUED: sildenafil citrate (VIAGRA) 100 mg tablet   2. Encounter for screening  Z13.9 V82.9 HEPATITIS C AB      HEPATITIS C AB   3. Obesity (BMI 30-39. 9)  E66.9 278.00    ED: D/t obesity vs PTSD. Endocrine labs done at outside facility wnl- scanned into media. Encounter for screening:  - Hep C collected    Obesity: May be contributing to ED. - Weight loss clinic follow up          I have discussed the diagnosis with the patient and the intended plan as seen in the above orders. Patient verbalized understanding of the plan and agrees with the plan. The patient has received an after-visit summary and questions were answered concerning future plans. I have discussed medication side effects and warnings with the patient as well. Informed patient to return to the office if new symptoms arise.         Aminata Bradshaw MD  Family Medicine Resident

## 2021-08-13 NOTE — PROGRESS NOTES
1. Have you been to the ER, urgent care clinic, or been hospitalized since your last visit? no    2. Have you seen or consulted any other health care providers outside of the [address] [place] [address] since your last visit? no    Reviewed record in preparation for visit and have necessary documentation  Goals that were addressed and/or need to be completed during or after this appointment include   Health Maintenance Due   Topic Date Due    Hepatitis C Screening  Never done    COVID-19 Vaccine (1) Never done    Lipid Screen  Never done       I have received verbal consent from Sven Gramajo to discuss any/all medical information while others present in the room.

## 2021-08-14 LAB — HCV AB SERPL QL IA: NONREACTIVE

## 2021-08-30 ENCOUNTER — TELEPHONE (OUTPATIENT)
Dept: FAMILY MEDICINE CLINIC | Age: 46
End: 2021-08-30

## 2021-08-30 NOTE — TELEPHONE ENCOUNTER
----- Message from Wilhemina Siemens sent at 8/30/2021  9:22 AM EDT -----  Regarding: Dr. Concepcion Channel not available    Caller's first and last name and relationship to patient (if not the patient): pt      Best contact number: [phone]      Preferred date and time: today      Scheduled appointment date and time: n/a       Reason for appointment: concerns with possible covid, headaches, nausea and low grade fever.  Needs testing before able to return to work until tested. (provided testing locations)      Details to clarify the request: n/a       Wilhemina Siemens

## 2022-03-18 PROBLEM — G56.03 CARPAL TUNNEL SYNDROME ON BOTH SIDES: Status: ACTIVE | Noted: 2019-02-19

## 2022-03-19 PROBLEM — M76.822 POSTERIOR TIBIAL TENDINITIS OF LEFT LOWER EXTREMITY: Status: ACTIVE | Noted: 2020-01-21

## 2022-03-19 PROBLEM — M54.2 CERVICALGIA: Status: ACTIVE | Noted: 2019-02-19

## 2022-03-19 PROBLEM — M47.812 SPONDYLOSIS OF CERVICAL REGION WITHOUT MYELOPATHY OR RADICULOPATHY: Status: ACTIVE | Noted: 2019-02-19

## 2022-03-20 PROBLEM — F43.10 PTSD (POST-TRAUMATIC STRESS DISORDER): Status: ACTIVE | Noted: 2019-12-13

## 2023-05-22 RX ORDER — SILDENAFIL 100 MG/1
50 TABLET, FILM COATED ORAL DAILY PRN
COMMUNITY
Start: 2021-08-13

## 2023-05-22 RX ORDER — CELECOXIB 200 MG/1
CAPSULE ORAL
COMMUNITY

## 2023-05-22 RX ORDER — TIZANIDINE 2 MG/1
TABLET ORAL
COMMUNITY

## 2023-05-22 RX ORDER — NAPROXEN SODIUM 220 MG
TABLET ORAL
COMMUNITY